# Patient Record
Sex: MALE | Race: WHITE | Employment: STUDENT | URBAN - METROPOLITAN AREA
[De-identification: names, ages, dates, MRNs, and addresses within clinical notes are randomized per-mention and may not be internally consistent; named-entity substitution may affect disease eponyms.]

---

## 2019-09-26 ENCOUNTER — HOSPITAL ENCOUNTER (EMERGENCY)
Facility: HOSPITAL | Age: 24
Discharge: HOME | End: 2019-09-26
Attending: EMERGENCY MEDICINE
Payer: COMMERCIAL

## 2019-09-26 ENCOUNTER — APPOINTMENT (OUTPATIENT)
Dept: GENERAL RADIOLOGY | Facility: HOSPITAL | Age: 24
End: 2019-09-26
Attending: EMERGENCY MEDICINE
Payer: COMMERCIAL

## 2019-09-26 VITALS
HEART RATE: 68 BPM | TEMPERATURE: 98 F | OXYGEN SATURATION: 98 % | RESPIRATION RATE: 14 BRPM | WEIGHT: 198 LBS | BODY MASS INDEX: 27.72 KG/M2 | SYSTOLIC BLOOD PRESSURE: 135 MMHG | HEIGHT: 71 IN | DIASTOLIC BLOOD PRESSURE: 82 MMHG

## 2019-09-26 DIAGNOSIS — S93.401A MODERATE RIGHT ANKLE SPRAIN, INITIAL ENCOUNTER: Primary | ICD-10-CM

## 2019-09-26 DIAGNOSIS — T14.8XXA AVULSION FRACTURE: ICD-10-CM

## 2019-09-26 PROCEDURE — 73610 X-RAY EXAM OF ANKLE: CPT | Performed by: EMERGENCY MEDICINE

## 2019-09-26 PROCEDURE — 99283 EMERGENCY DEPT VISIT LOW MDM: CPT

## 2019-09-26 NOTE — ED INITIAL ASSESSMENT (HPI)
Pt to ed with rt ankle pain after falling down approx 4 steps.  Rt ankle swelling noted at time of triage

## 2019-09-26 NOTE — ED PROVIDER NOTES
Patient Seen in: [facility] Emergency Department      History   Patient presents with:  Lower Extremity Injury (musculoskeletal)    Stated Complaint: fell down stairs right ankle injury     HPI    27-year-old male complaining of right ankle pain the 9/26/2019 at 8:17     Approved by: Jon Spence MD          Patient has avulsion fracture with a considerable amount of swelling over the lateral malleolus the patient was placed in a ankle stirrup splint given crutches        MDM     Patient is advised

## (undated) NOTE — ED AVS SNAPSHOT
Sofia Palm   MRN: [id number]    Department:  [facility] Emergency Department   Date of Visit:  9/26/2019           Disclosure     Insurance plans vary and the physician(s) referred by the ER may not be covered by your plan.  Please contact your tell this physician (or your personal doctor if your instructions are to return to your personal doctor) about any new or lasting problems. The primary care or specialist physician will see patients referred from the [facility] Emergency Department.  Roselyn Trivedi

## (undated) NOTE — LETTER
September 26, 2019    Patient: Jaswant Reeves   Date of Visit: 9/26/2019       To Whom It May Concern:    Jaswant Reeves was seen and treated in our emergency department on 9/26/2019.  He should not return to work until October 1 May return sooner if be